# Patient Record
Sex: FEMALE | Race: WHITE | NOT HISPANIC OR LATINO | Employment: OTHER | URBAN - METROPOLITAN AREA
[De-identification: names, ages, dates, MRNs, and addresses within clinical notes are randomized per-mention and may not be internally consistent; named-entity substitution may affect disease eponyms.]

---

## 2019-12-06 ENCOUNTER — OFFICE VISIT (OUTPATIENT)
Dept: UROLOGY | Facility: CLINIC | Age: 59
End: 2019-12-06
Payer: MEDICAID

## 2019-12-06 VITALS
BODY MASS INDEX: 22.76 KG/M2 | DIASTOLIC BLOOD PRESSURE: 73 MMHG | SYSTOLIC BLOOD PRESSURE: 113 MMHG | HEIGHT: 67 IN | HEART RATE: 91 BPM | RESPIRATION RATE: 18 BRPM | WEIGHT: 145 LBS

## 2019-12-06 DIAGNOSIS — R10.2 PELVIC PAIN: Primary | ICD-10-CM

## 2019-12-06 DIAGNOSIS — N30.00 ACUTE CYSTITIS WITHOUT HEMATURIA: ICD-10-CM

## 2019-12-06 LAB
BILIRUB UR QL STRIP: NEGATIVE
GLUCOSE UR QL STRIP: NEGATIVE
KETONES UR QL STRIP: NEGATIVE
LEUKOCYTE ESTERASE UR QL STRIP: NEGATIVE
PH, POC UA: 6
POC AMORP, URINE: ABNORMAL
POC BACTI, URINE: ABNORMAL
POC BLOOD, URINE: POSITIVE
POC CASTS, URINE: ABNORMAL
POC CRYST, URINE: ABNORMAL
POC EPITH, URINE: ABNORMAL
POC HCG, URINE: ABNORMAL
POC HYALIN, URINE: 0 LPF
POC MUCUS, URINE: ABNORMAL
POC NITRATES, URINE: NEGATIVE
POC OTHER, URINE: ABNORMAL
POC RBC, URINE: 0 HPF
POC WBC, URINE: 0 HPF
PROT UR QL STRIP: NEGATIVE
SP GR UR STRIP: 1 (ref 1–1.03)
UROBILINOGEN UR STRIP-ACNC: 0.2 (ref 0.1–1.1)

## 2019-12-06 PROCEDURE — 51798 PR MEAS,POST-VOID RES,US,NON-IMAGING: ICD-10-PCS | Mod: S$GLB,,, | Performed by: NURSE PRACTITIONER

## 2019-12-06 PROCEDURE — 99204 PR OFFICE/OUTPT VISIT, NEW, LEVL IV, 45-59 MIN: ICD-10-PCS | Mod: 25,S$GLB,, | Performed by: NURSE PRACTITIONER

## 2019-12-06 PROCEDURE — 81001 PR  URINALYSIS, AUTO, W/SCOPE: ICD-10-PCS | Mod: S$GLB,,, | Performed by: NURSE PRACTITIONER

## 2019-12-06 PROCEDURE — 99204 OFFICE O/P NEW MOD 45 MIN: CPT | Mod: 25,S$GLB,, | Performed by: NURSE PRACTITIONER

## 2019-12-06 PROCEDURE — 51798 US URINE CAPACITY MEASURE: CPT | Mod: S$GLB,,, | Performed by: NURSE PRACTITIONER

## 2019-12-06 PROCEDURE — 81001 URINALYSIS AUTO W/SCOPE: CPT | Mod: S$GLB,,, | Performed by: NURSE PRACTITIONER

## 2019-12-06 NOTE — PROGRESS NOTES
Chief Complaint:   Chief Complaint   Patient presents with    Urinary Tract Infection     recently in hosp @ [facility] for UTI was septic etc..very sick.     Other     saw OB bc uterus was swollen? couldnt get pt open for exam due to pain and swelling assumed bladder prolapse referred to us        HPI:   58-year-old  female who presents for evaluation of possible bladder prolapse.  Patient was recently hospitalized, diagnosed with UTI during hospitalization.  She was treated for sepsis. She also had diarrhea, underwent EGD/colonoscopy, and cholecystectomy.  CT abdomen/pelvis performed during hospitalization did not show any significant abnormalities to the urinary tract.  She presented for follow-up to her PCP with complaints of lower abdominal swelling and pelvic pain.  She felt pelvic pressure as if her organs were falling out.  She admits to vaginal pain and sensitivity.  She also admits to urinary frequency but denies any incontinence or leaking of urine.  She is able to start and stop her stream of urination and has an adequate stream.  She has had 1 vaginal birth.  No other urinary complaints today.    Allergies:  Demerol [meperidine]    Medications: currently has no medications in their medication list.    Review of Systems:  General: No fever, chills, vision changes, dizziness, weakness, fatigue, unexplained weight loss, confusion, or mood swings.  Skin: No rashes, itching, or changes in color/texture of skin.  Chest: Denies BRANCH, cyanosis, wheezing, cough, and sputum production.  Abdomen: Appetite fine. Denies diarrhea, abdominal pain, hematemesis, or blood in stool.  Musculoskeletal: No joint stiffness or swelling. Denies back pain.  : As above.  All other review of systems negative.    PMH:   has no past medical history on file.    PSH:   has a past surgical history that includes Cholecystectomy;  section; and Tonsillectomy.    FamHx: family history includes Bladder Cancer in her maternal  grandfather; Cancer in her maternal aunt, maternal grandfather, and maternal grandmother; Heart attack in her father; Heart disease in her father; No Known Problems in her mother; Uterine cancer in her maternal aunt and maternal grandmother.    SocHx:  reports that she has never smoked. She has never used smokeless tobacco. She reports that she drank alcohol. She reports that she does not use drugs.      Physical Exam:  Vitals:    12/06/19 1053   BP: 113/73   Pulse: 91   Resp: 18     General: AAOx3, no apparent distress, no deformities  Neck: supple, no masses, normal thyroid  Lungs: normal inspiration, no use of accessory muscles  Heart: regular rate and rhythm, no arrhythmias  Abdomen: soft, NT, ND, no masses, no hernias, no hepatosplenomegaly  Lymphatic: no unusually enlarged or tender lymph nodes  Skin: warm and dry, no jaundice.  Ext: without edema or deformity.  : External genitalia normal. No lesions. Meatus normal size and location. Urethra normal. No masses. Bladder normal. No fullness or masses. Vagina pale with outdischarge or lesions. No urinary leakage or bladder prolapse noted with coughing. Tenderness with palpation to vaginal walls    Labs/Studies: UA negative for infection or hematuria; bladder scan post void 12cc    Impression/Plan:   Pelvic pain  Comments:  No evidence of vaginal vault prolapse, will refer for pelvic floor PT with biofeedback and reassess for improvement of symptoms  Orders:  -     POCT Urinalysis (w/Micro Option)  -     POCT Bladder Scan  -     Ambulatory Referral to Physical/Occupational Therapy    Acute cystitis without hematuria  Comments:  Recent diagnosis of UTI, hospitalized for sepsis, UA negative today        Follow up in about 4 months (around 4/6/2020).

## 2020-01-08 ENCOUNTER — TELEPHONE (OUTPATIENT)
Dept: UROLOGY | Facility: CLINIC | Age: 60
End: 2020-01-08

## 2020-01-08 NOTE — TELEPHONE ENCOUNTER
Called and spoke with patient in regards message. Patient verbalized receiving continuous messages for a referral and appointment, however patient has already been seen in clinic one month prior and patient also has follow up.

## 2024-10-17 ENCOUNTER — PROCEDURE VISIT (OUTPATIENT)
Dept: UROLOGY | Facility: CLINIC | Age: 64
End: 2024-10-17
Payer: MEDICAID

## 2024-10-17 VITALS
HEIGHT: 67 IN | DIASTOLIC BLOOD PRESSURE: 68 MMHG | SYSTOLIC BLOOD PRESSURE: 106 MMHG | WEIGHT: 160 LBS | BODY MASS INDEX: 25.11 KG/M2 | HEART RATE: 82 BPM

## 2024-10-17 DIAGNOSIS — R31.0 GROSS HEMATURIA: ICD-10-CM

## 2024-10-17 NOTE — PROCEDURES
Cystoscopy    Date/Time: 10/17/2024 9:30 AM    Performed by: Mark Joseph MD  Authorized by: Esha Elena NP    Timeout: prior to procedure the correct patient, procedure, and site was verified    Prep: patient was prepped and draped in usual sterile fashion    Anesthesia:  Intraurethral instillation  Indications: hematuria    Position:  Supine  Anesthesia:  Intraurethral instillation  Patient sedated?: No    Preparation: Patient was prepped and draped in usual sterile fashion    Scope type:  Flexible cystoscope  External exam normal: Yes    Urethra normal: Yes    Comments:      The patient was brought to the procedure room placed on the table padded prepped and draped in usual sterile fashion in supine position. The cystoscope was inserted into the urethra and advanced the urethra was normal. The bladder was entered and inspected, it was found to be free of tumor stone or foreign body.  Bilateral ureteral orifices were identified and noted to be normal in appearance with clear efflux of urine at this point the scope was removed the patient tolerated the procedure well there were no complications.  Pelvic exam was performed and no abnormalities were noted.